# Patient Record
Sex: FEMALE | Race: ASIAN | NOT HISPANIC OR LATINO
[De-identification: names, ages, dates, MRNs, and addresses within clinical notes are randomized per-mention and may not be internally consistent; named-entity substitution may affect disease eponyms.]

---

## 2019-10-25 ENCOUNTER — APPOINTMENT (OUTPATIENT)
Dept: UROLOGY | Facility: CLINIC | Age: 49
End: 2019-10-25
Payer: COMMERCIAL

## 2019-10-25 VITALS
DIASTOLIC BLOOD PRESSURE: 76 MMHG | OXYGEN SATURATION: 100 % | BODY MASS INDEX: 21.54 KG/M2 | WEIGHT: 114 LBS | SYSTOLIC BLOOD PRESSURE: 119 MMHG | TEMPERATURE: 97.4 F | RESPIRATION RATE: 18 BRPM | HEART RATE: 79 BPM

## 2019-10-25 DIAGNOSIS — R93.421 ABNORMAL RADIOLOGIC FINDINGS ON DIAGNOSITIC IMAGING OF RIGHT KIDNEY: ICD-10-CM

## 2019-10-25 DIAGNOSIS — R10.9 UNSPECIFIED ABDOMINAL PAIN: ICD-10-CM

## 2019-10-25 DIAGNOSIS — N28.1 CYST OF KIDNEY, ACQUIRED: ICD-10-CM

## 2019-10-25 PROCEDURE — 99204 OFFICE O/P NEW MOD 45 MIN: CPT

## 2019-10-25 NOTE — PHYSICAL EXAM
[General Appearance - Well Developed] : well developed [General Appearance - Well Nourished] : well nourished [Normal Appearance] : normal appearance [Well Groomed] : well groomed [General Appearance - In No Acute Distress] : no acute distress [Edema] : no peripheral edema [Respiration, Rhythm And Depth] : normal respiratory rhythm and effort [Exaggerated Use Of Accessory Muscles For Inspiration] : no accessory muscle use [Abdomen Soft] : soft [Costovertebral Angle Tenderness] : no ~M costovertebral angle tenderness [Abdomen Tenderness] : non-tender [Urinary Bladder Findings] : the bladder was normal on palpation [Normal Station and Gait] : the gait and station were normal for the patient's age [] : no rash [No Focal Deficits] : no focal deficits [Oriented To Time, Place, And Person] : oriented to person, place, and time [Affect] : the affect was normal [Mood] : the mood was normal [Not Anxious] : not anxious [No Palpable Adenopathy] : no palpable adenopathy

## 2019-10-27 LAB
ANION GAP SERPL CALC-SCNC: 13 MMOL/L
APPEARANCE: CLEAR
BACTERIA: NEGATIVE
BILIRUBIN URINE: NEGATIVE
BLOOD URINE: ABNORMAL
BUN SERPL-MCNC: 17 MG/DL
CALCIUM SERPL-MCNC: 9.7 MG/DL
CHLORIDE SERPL-SCNC: 101 MMOL/L
CO2 SERPL-SCNC: 29 MMOL/L
COLOR: NORMAL
CREAT SERPL-MCNC: 0.68 MG/DL
GLUCOSE QUALITATIVE U: NEGATIVE
GLUCOSE SERPL-MCNC: 87 MG/DL
HYALINE CASTS: 1 /LPF
KETONES URINE: NEGATIVE
LEUKOCYTE ESTERASE URINE: ABNORMAL
MICROSCOPIC-UA: NORMAL
NITRITE URINE: NEGATIVE
PH URINE: 6.5
POTASSIUM SERPL-SCNC: 3.9 MMOL/L
PROTEIN URINE: NORMAL
RED BLOOD CELLS URINE: 2 /HPF
SODIUM SERPL-SCNC: 143 MMOL/L
SPECIFIC GRAVITY URINE: 1.02
SQUAMOUS EPITHELIAL CELLS: 1 /HPF
UROBILINOGEN URINE: NORMAL
WHITE BLOOD CELLS URINE: 4 /HPF

## 2019-10-30 LAB — URINE CYTOLOGY: NORMAL

## 2019-10-31 NOTE — LETTER BODY
[FreeTextEntry1] : James Thomas MD\par [address] [address] [place] [address] - [address]\[place]  [address]\par [phone]\par [phone]\par \par Dear Dr. Thomas,\par \par Reason for visit: Abnormal imaging. \par \par This is a 49 year-old woman with abnormal abdominal imaging. Patient was last seen by me 7 years ago for microscopic hematuria evaluation for which she obtained an unremarkable cystoscopy. Recent urinalysis demonstrated evidence of microscopic hematuria. Patient also underwent ultrasound at [facility], which demonstrated possible right complex renal cyst. She denies any gross hematuria, dysuria or urinary incontinence. Patient denies any flank pain, hematuria, or urinary difficulties. Patient denies any urinary incontinence. The patient denies any aggravating or relieving factors. The patient denies any interference of function. The patient is entirely asymptomatic. All other review of systems are negative. She has no cancer in her family medical history. She has previous surgical history. Past medical history, family history and social history were inquired and were noncontributory to current condition. The patient does not use tobacco or drink alcohol. Medications and allergies were reviewed. She has no known allergies to medication. \par \par On examination, the patient is a healthy-appearing woman in no acute distress. She is alert and oriented follows commands. She  has normal mood and affect. She is normocephalic. Neck is supple. Oral no thrush. Respirations are unlabored. Abdomen is soft and nontender. Bladder is nonpalpable. No CVA tenderness. Neurologically she is grossly intact. No peripheral edema. Skin without gross abnormality.\par \par Assessment: Abnormal urinary imaging. Complex right renal cyst. Microscopic hematuria.\par \par I counseled the patient. I reassured the patient. I recommend she obtain a CT urogram to evaluate for her complex right renal cyst. I counseled the patient regarding the procedure. The risks and benefits were discussed. Alternatives were given. I answered the patient questions. The patient will take the necessary preparations for the procedure. In terms of her microscopic hematuria, I recommend she obtain urinalysis, cytology, and BMP testing to further evaluate. The patient will follow-up as directed and will contact me with any questions or concerns. Thank you for the opportunity to participate in the care of Ms. BERNARDA. I will keep you updated on her progress.\par \par Plan: BMP. Urinalysis. Cytology. CT urogram. Follow up as directed.

## 2019-10-31 NOTE — ADDENDUM
[FreeTextEntry1] : Entered by Zack Coleman, acting as scribe for Dr. Kel Medrano.\par \par The documentation recorded by the scribe accurately reflects the service I personally performed and the decisions made by me.

## 2019-12-02 PROBLEM — N28.1 COMPLEX RENAL CYST: Status: ACTIVE | Noted: 2019-10-25

## 2020-04-03 ENCOUNTER — APPOINTMENT (OUTPATIENT)
Dept: UROLOGY | Facility: CLINIC | Age: 50
End: 2020-04-03

## 2022-10-04 ENCOUNTER — APPOINTMENT (OUTPATIENT)
Dept: UROLOGY | Facility: CLINIC | Age: 52
End: 2022-10-04

## 2022-10-04 VITALS
WEIGHT: 113 LBS | HEART RATE: 69 BPM | DIASTOLIC BLOOD PRESSURE: 70 MMHG | SYSTOLIC BLOOD PRESSURE: 107 MMHG | TEMPERATURE: 97.1 F | RESPIRATION RATE: 18 BRPM | BODY MASS INDEX: 21.35 KG/M2 | OXYGEN SATURATION: 100 %

## 2022-10-04 DIAGNOSIS — R33.9 RETENTION OF URINE, UNSPECIFIED: ICD-10-CM

## 2022-10-04 DIAGNOSIS — Z78.9 OTHER SPECIFIED HEALTH STATUS: ICD-10-CM

## 2022-10-04 PROCEDURE — 99214 OFFICE O/P EST MOD 30 MIN: CPT

## 2022-10-04 PROCEDURE — 76775 US EXAM ABDO BACK WALL LIM: CPT

## 2022-10-04 NOTE — LETTER BODY
[FreeTextEntry1] : James Thomas MD\par [address] [address] [place] [address] - [address]\[place] [address]\par [phone]\par [phone]\par \par Dear Dr. Thomas,\par \par Reason for visit: Abnormal imaging. Complex right renal cyst. Lung nodules. Dysuria.\par \par This is a 52 year-old woman with abnormal abdominal imaging. Patient underwent unremarkable cystoscopy for hematuria evaluation 10 years ago. Her ultrasound demonstrated possible right complex renal cyst. Patient returns today for follow up. Since she was last seen, patient underwent a CT scan this year that demonstrated lung nodules. The patient reports seeing Dr. Tellez regarding her lung nodules. Patient also reports dysuria. She denies any gross hematuria or urinary incontinence. Patient denies any flank pain, hematuria, or urinary difficulties. Patient denies any urinary incontinence. The patient denies any aggravating or relieving factors. The patient denies any interference of function. The patient is entirely asymptomatic. All other review of systems are negative. She has no cancer in her family medical history. She has previous surgical history. Past medical history, family history and social history were inquired and were noncontributory to current condition. The patient does not use tobacco or drink alcohol. Medications and allergies were reviewed. She has no known allergies to medication. \par \par On examination, the patient is a healthy-appearing woman in no acute distress. She is alert and oriented follows commands. She has normal mood and affect. She is normocephalic. Neck is supple. Oral no thrush. Respirations are unlabored. Abdomen is soft and nontender. Bladder is nonpalpable. No CVA tenderness. Neurologically she is grossly intact. No peripheral edema. Skin without gross abnormality.\par \par I personally reviewed CT scan with the patient today and results demonstrated stable lung nodules.\par \par Her BMP from October 2019 demonstrated normal renal functions, creatinine 0.68. Her urinalysis was unremarkable.\par \par Assessment: Abnormal urinary imaging. Complex right renal cyst. Lung nodules. Dysuria.\par \par I counseled the patient. In terms of her complex right renal cyst, I recommended she repeat renal ultrasound to ensure stability. In terms of her lung nodules, I recommended she continue following up with Dr. Tellez. In terms of her dysuria and previous microscopic hematuria, I recommended she repeat urinalysis, cytology, and urine culture to further evaluate. The patient will follow-up as directed and will contact me with any questions or concerns. Thank you for the opportunity to participate in the care of Ms. MENCHACA. I will keep you updated on her progress.\par \par Plan: Urine culture. Urinalysis. Urine cytology. Renal ultrasound. Follow up with Dr. Tellez. Follow up as directed.

## 2022-10-04 NOTE — ADDENDUM
[FreeTextEntry1] : Entered by JEREMY COLEY, acting as scribe for Dr. Kel Medrano.\par The documentation recorded by the scribe accurately reflects the service I personally performed and the decisions made by me.

## 2022-10-09 DIAGNOSIS — R31.29 OTHER MICROSCOPIC HEMATURIA: ICD-10-CM

## 2022-10-09 DIAGNOSIS — Z00.00 ENCOUNTER FOR GENERAL ADULT MEDICAL EXAMINATION W/OUT ABNORMAL FINDINGS: ICD-10-CM

## 2022-10-09 LAB
APPEARANCE: CLEAR
BACTERIA UR CULT: NORMAL
BACTERIA: NEGATIVE
BILIRUBIN URINE: NEGATIVE
BLOOD URINE: ABNORMAL
CALCIUM OXALATE CRYSTALS: ABNORMAL
COLOR: NORMAL
GLUCOSE QUALITATIVE U: NEGATIVE
HYALINE CASTS: 1 /LPF
KETONES URINE: NEGATIVE
LEUKOCYTE ESTERASE URINE: NEGATIVE
MICROSCOPIC-UA: NORMAL
NITRITE URINE: NEGATIVE
PH URINE: 5.5
PROTEIN URINE: NORMAL
RED BLOOD CELLS URINE: 5 /HPF
SPECIFIC GRAVITY URINE: 1.02
SQUAMOUS EPITHELIAL CELLS: 0 /HPF
URINE CYTOLOGY: NORMAL
UROBILINOGEN URINE: NORMAL
WHITE BLOOD CELLS URINE: 1 /HPF

## 2022-10-28 ENCOUNTER — APPOINTMENT (OUTPATIENT)
Dept: UROLOGY | Facility: CLINIC | Age: 52
End: 2022-10-28

## 2022-10-28 VITALS
SYSTOLIC BLOOD PRESSURE: 106 MMHG | DIASTOLIC BLOOD PRESSURE: 64 MMHG | WEIGHT: 113 LBS | TEMPERATURE: 98 F | RESPIRATION RATE: 18 BRPM | OXYGEN SATURATION: 98 % | BODY MASS INDEX: 21.35 KG/M2 | HEART RATE: 73 BPM

## 2022-10-28 PROCEDURE — 52000 CYSTOURETHROSCOPY: CPT

## 2022-10-31 LAB — URINE CYTOLOGY: NORMAL
